# Patient Record
Sex: FEMALE | Race: WHITE | NOT HISPANIC OR LATINO | Employment: UNEMPLOYED | URBAN - METROPOLITAN AREA
[De-identification: names, ages, dates, MRNs, and addresses within clinical notes are randomized per-mention and may not be internally consistent; named-entity substitution may affect disease eponyms.]

---

## 2023-06-05 ENCOUNTER — HOSPITAL ENCOUNTER (OUTPATIENT)
Dept: DATA CONVERSION | Facility: HOSPITAL | Age: 48
End: 2023-06-05
Attending: SURGERY | Admitting: SURGERY
Payer: COMMERCIAL

## 2023-06-05 DIAGNOSIS — K80.20 CALCULUS OF GALLBLADDER WITHOUT CHOLECYSTITIS WITHOUT OBSTRUCTION: ICD-10-CM

## 2023-06-05 DIAGNOSIS — K66.0 PERITONEAL ADHESIONS (POSTPROCEDURAL) (POSTINFECTION): ICD-10-CM

## 2023-06-05 DIAGNOSIS — K25.9 GASTRIC ULCER, UNSPECIFIED AS ACUTE OR CHRONIC, WITHOUT HEMORRHAGE OR PERFORATION: ICD-10-CM

## 2023-06-05 DIAGNOSIS — K21.9 GASTRO-ESOPHAGEAL REFLUX DISEASE WITHOUT ESOPHAGITIS: ICD-10-CM

## 2023-06-05 DIAGNOSIS — K44.9 DIAPHRAGMATIC HERNIA WITHOUT OBSTRUCTION OR GANGRENE: ICD-10-CM

## 2023-06-05 DIAGNOSIS — R12 HEARTBURN: ICD-10-CM

## 2023-06-05 DIAGNOSIS — I10 ESSENTIAL (PRIMARY) HYPERTENSION: ICD-10-CM

## 2023-06-05 DIAGNOSIS — K29.70 GASTRITIS, UNSPECIFIED, WITHOUT BLEEDING: ICD-10-CM

## 2023-06-09 LAB
COMPLETE PATHOLOGY REPORT: NORMAL
CONVERTED CLINICAL DIAGNOSIS-HISTORY: NORMAL
CONVERTED FINAL DIAGNOSIS: NORMAL
CONVERTED FINAL REPORT PDF LINK TO COPY AND PASTE: NORMAL
CONVERTED GROSS DESCRIPTION: NORMAL

## 2023-09-30 NOTE — H&P
History & Physical Reviewed:   Pregnant/Lactating:  ·  Are You Pregnant no   ·  Are You Currently Breastfeeding no     I have reviewed the History and Physical dated:  26-May-2023   History and Physical reviewed and relevant findings noted. Patient examined to review pertinent physical  findings.: No significant changes   Home Medications Reviewed: no changes noted   Allergies Reviewed: no changes noted       ERAS (Enhanced Recovery After Surgery):  ·  ERAS Patient: no     Consent:   COVID-19 Consent:  ·  COVID-19 Risk Consent Surgeon has reviewed key risks related to the risk of kerry COVID-19 and if they contract COVID-19 what the risks are.     Attestation:   Note Completion:  I am a:  Resident/Fellow   Attending Attestation I saw and evaluated the patient.  I personally obtained the key and critical portions of the history and physical exam or was physically present for key and  critical portions performed by the resident/fellow. I reviewed the resident/fellow?s documentation and discussed the patient with the resident/fellow.  I agree with the resident/fellow?s medical decision making as documented in the note.     I personally evaluated the patient on 05-Jun-2023         Electronic Signatures:  Héctor Yao)  (Signed 05-Jun-2023 07:48)   Authored: Note Completion   Co-Signer: History & Physical Reviewed, ERAS, Consent, Note Completion  Lenny Saleh (Resident))  (Signed 05-Jun-2023 07:24)   Authored: History & Physical Reviewed, ERAS, Consent,  Note Completion      Last Updated: 05-Jun-2023 07:48 by Héctor Yao)

## 2023-10-02 NOTE — OP NOTE
PROCEDURE DETAILS    Preoperative Diagnosis:  Calculus of gallbladder without cholecystitis, K80.20  GERD (gastroesophageal reflux disease), K21.9    Postoperative Diagnosis:  Calculus of gallbladder without cholecystitis, K80.20  GERD (gastroesophageal reflux disease), K21.9  Hiatal hernia, rule out Salinas's esophagus  Surgeon: Héctor Yao  Resident/Fellow/Other Assistant: Clint De La O JJ    Procedure:  1. LAPAROSCOPIC CHOLECYSTECTOMY POSSIBLE OPEN  2. EGD WITH BIOPSY    Anesthesia: See OR Record  Estimated Blood Loss: 5 cc  Findings: Anterior and posterior artery with large neck stone and stomach adhered. Bile reflux, gastric ulcer, gastritis, 3 cm hiatal hernia with 3 cm of salmon colored esophageal mucosa.  Specimens(s) Collected: yes,  Gallbladder. Stomach, ulcer, and esophageal biopsies.   IV Fluids: See OR Record        Operative Report:   During the preoperative huddle the patient's identifiers, consent, operation details, and equipment was verified. The patient was taken to the operating room  and then placed in the supine position with both arms abducted and pressure points were padded. Sequential compression stockings were placed and general endotracheal anesthesia was administered. The patient was then prepped and draped in the usual sterile  fashion. Prophylactic antibiotics were administered. A surgical timeout was then performed confirming the correct patient, procedure, position, equipment, and any necessary operative implants.     Access to the abdomen was gained through a right midclavicular incision and utilized a 0° 5 mm laparoscope with an Optiview trocar. The abdomen was insufflated to a pressure of 15 mmHg after peritoneal access was obtained and there was no injury to  bowel or surrounding structures visualized. A 12 mm port was then placed at the umbilicus. An additional 5 mm port was placed in subxiphoid region, and an additional 5 mm trocar was placed at the  right anterior axillary line. All ports were placed under  direct visualization.    The gallbladder was then grasped and retracted cephalad by the dome and inferiorly and laterally at the infundibulum to open up the [place] of [place]. Thick adhesions to the pylorus were taken down sharply. Gross fatty attachments were taken off the gallbladder  with electrocautery, avoiding contact with underlying structures. Next, the peritoneal attachments on both the medial and lateral surfaces as well as the anterior surface of the gallbladder were taken with electrocautery. The [place] of [place] was then  dissected out and freed of any fatty attachments. Carrying on this dissection further revealed 2 and only 2 structures entering the gallbladder. The cystic plate was then dissected up about 30% of the gallbladder. The critical view of safety was then  obtained. The cystic artery was taken with two clips proximally and one distally. The cystic duct was taken close the infundibulum with two clips distally and one proximally. One lumen was visualized for each of these structures. An additional posterior  branch was found next and also clipped before cutting.    The gallbladder was then dissected off of the cystic plate using hook electrocautery. The liver bed was hemostatic and there was minimal spillage through a small posterior perforation. It was of no clinical consequence and related to the intrahepatic  nature of the dilated gallbladder. The gallbladder was placed into an Endo Catch bag and removed through the umbilicus. The 12 mm port site was closed with a figure-of-8 0-vicryl suture with the abdomen desufflated. The abdomen was then re-insufflated  and the suture was found to be adequate without any abdominal contents stuck the suture. In addition the liver bed was re-assessed and found to be hemostatic. Irrigation of the cystic plate was completed to wash away the minimal spilled bile, which was  clear.     The abdomen  was desufflated under direct visualization. All ports were removed. All counts were correct. The port sites were closed with a 4-0 Monocryl in the subcuticular plane. Dermabond was applied.    An EGD was performed with the following findings. Bile reflux through a wide open pylorus, gastric ulcer at lesser curve of body, gastritis throughout, 3 cm hiatal hernia with 3 cm of salmon colored esophageal mucosa which was biopsied. This was dictated  in provation.    The patient tolerated the procedure well. The patient was transported to the PACU in stable condition. I was present and scrubbed for all critical  portions of the case.                        Attestation:   Note Completion:  Attending Attestation I performed the procedure without a resident         Electronic Signatures:  Héctor Yao)  (Signed 05-Jun-2023 09:08)   Authored: Post-Operative Note, Chart Review, Note Completion      Last Updated: 05-Jun-2023 09:08 by Héctor Yao)

## 2024-01-02 ENCOUNTER — OFFICE VISIT (OUTPATIENT)
Dept: OBSTETRICS AND GYNECOLOGY | Facility: CLINIC | Age: 49
End: 2024-01-02
Payer: COMMERCIAL

## 2024-01-02 VITALS
WEIGHT: 230.7 LBS | SYSTOLIC BLOOD PRESSURE: 158 MMHG | HEIGHT: 67 IN | BODY MASS INDEX: 36.21 KG/M2 | DIASTOLIC BLOOD PRESSURE: 99 MMHG

## 2024-01-02 DIAGNOSIS — Z30.41 SURVEILLANCE FOR BIRTH CONTROL, ORAL CONTRACEPTIVES: Primary | ICD-10-CM

## 2024-01-02 DIAGNOSIS — Z12.31 SCREENING MAMMOGRAM FOR BREAST CANCER: ICD-10-CM

## 2024-01-02 PROCEDURE — 1036F TOBACCO NON-USER: CPT | Performed by: NURSE PRACTITIONER

## 2024-01-02 PROCEDURE — 99203 OFFICE O/P NEW LOW 30 MIN: CPT | Performed by: NURSE PRACTITIONER

## 2024-01-02 RX ORDER — METOPROLOL SUCCINATE 25 MG/1
25 TABLET, EXTENDED RELEASE ORAL DAILY
COMMUNITY

## 2024-01-02 RX ORDER — NORETHINDRONE 0.35 MG/1
1 TABLET ORAL DAILY
Qty: 28 TABLET | Refills: 12 | Status: SHIPPED | OUTPATIENT
Start: 2024-01-02 | End: 2024-02-09 | Stop reason: SDUPTHER

## 2024-01-02 RX ORDER — LISINOPRIL 40 MG/1
40 TABLET ORAL DAILY
COMMUNITY

## 2024-01-02 RX ORDER — NORETHINDRONE 0.35 MG/1
1 TABLET ORAL DAILY
COMMUNITY
End: 2024-01-02 | Stop reason: SDUPTHER

## 2024-01-02 RX ORDER — SEMAGLUTIDE 0.25 MG/.5ML
0.25 INJECTION, SOLUTION SUBCUTANEOUS
COMMUNITY
Start: 2023-03-27

## 2024-01-02 NOTE — PROGRESS NOTES
CC: Contraception discussion    HPI:  Rosa Drake is a 48 y.o. woman who presents to discuss contraception. Currently using progesterone-only pills. She is satisfied with this method, but having irreg BTB at times. Previously tried COCs, continuous method, but did have HTN and went to POP. She is sexually active with one male partners in the last year. She does not desire to conceive in the next few years.    Menses are irregular. Menstrual symptoms include: very heavy prior to going on COCs   LMP: Patient's last menstrual period was 12/31/2023.   Last mammogram normal 3/17/22  , has 3 adult children. Oldest daughter has 4 children.    Last pap: 12/15/22 negative, did have one abnormal 2018, ASCUS, negative HPV    Past Medical History:   Diagnosis Date    Hypertension      Past Surgical History:   Procedure Laterality Date    CHOLECYSTECTOMY      NO PAST SURGERIES      toe surgery    ROTATOR CUFF REPAIR      TOTAL KNEE ARTHROPLASTY Left 2023     No family history on file.  Social History     Socioeconomic History    Marital status:      Spouse name: Not on file    Number of children: Not on file    Years of education: Not on file    Highest education level: Not on file   Occupational History    Not on file   Tobacco Use    Smoking status: Never    Smokeless tobacco: Never   Substance and Sexual Activity    Alcohol use: Not on file    Drug use: Not on file    Sexual activity: Not on file   Other Topics Concern    Not on file   Social History Narrative    Not on file     Social Determinants of Health     Financial Resource Strain: Not on file   Food Insecurity: Not on file   Transportation Needs: Not on file   Physical Activity: Not on file   Stress: Not on file   Social Connections: Not on file   Intimate Partner Violence: Not on file   Housing Stability: Not on file       Current Outpatient Medications:     lisinopril 40 mg tablet, Take 1 tablet (40 mg) by mouth once daily., Disp: , Rfl:      metoprolol succinate XL (Toprol-XL) 25 mg 24 hr tablet, Take 1 tablet (25 mg) by mouth once daily., Disp: , Rfl:     norethindrone (Micronor) 0.35 mg tablet, Take 1 tablet (0.35 mg) by mouth once daily., Disp: , Rfl:     Wegovy 0.25 mg/0.5 mL pen injector, Inject 0.25 mg under the skin 1 (one) time per week., Disp: , Rfl:   Patient has no known allergies.    EXAM:  Gen: well appearing women in NAD    ASSESSMENT: good candidate for progesterone only pills. Pt has h/o HTN, has occas BTB. Discussed perimenopause at  length.    PLAN:  - progesterone-only pills, BTB can be normal, keep menstrual calendar  - Mammogram screening ordered for 3/2024  follow up: annual exam and PRN    Greater than 50% of this 25 minute visit was spent in direct patient counseling

## 2024-02-09 ENCOUNTER — PATIENT MESSAGE (OUTPATIENT)
Dept: OBSTETRICS AND GYNECOLOGY | Facility: CLINIC | Age: 49
End: 2024-02-09
Payer: COMMERCIAL

## 2024-02-09 DIAGNOSIS — Z30.41 SURVEILLANCE FOR BIRTH CONTROL, ORAL CONTRACEPTIVES: ICD-10-CM

## 2024-02-09 RX ORDER — NORETHINDRONE 0.35 MG/1
1 TABLET ORAL DAILY
Qty: 90 TABLET | Refills: 3 | Status: SHIPPED | OUTPATIENT
Start: 2024-02-09 | End: 2024-05-31

## 2024-03-19 ENCOUNTER — HOSPITAL ENCOUNTER (OUTPATIENT)
Dept: RADIOLOGY | Facility: CLINIC | Age: 49
Discharge: HOME | End: 2024-03-19
Payer: COMMERCIAL

## 2024-03-19 VITALS — WEIGHT: 220 LBS | BODY MASS INDEX: 36.65 KG/M2 | HEIGHT: 65 IN

## 2024-03-19 DIAGNOSIS — Z12.31 SCREENING MAMMOGRAM FOR BREAST CANCER: ICD-10-CM

## 2024-03-19 PROCEDURE — 77067 SCR MAMMO BI INCL CAD: CPT

## 2024-03-19 PROCEDURE — 77067 SCR MAMMO BI INCL CAD: CPT | Performed by: RADIOLOGY

## 2024-03-19 PROCEDURE — 77063 BREAST TOMOSYNTHESIS BI: CPT | Performed by: RADIOLOGY

## 2024-03-20 ENCOUNTER — HOSPITAL ENCOUNTER (OUTPATIENT)
Dept: RADIOLOGY | Facility: EXTERNAL LOCATION | Age: 49
Discharge: HOME | End: 2024-03-20

## 2024-05-30 DIAGNOSIS — Z30.41 SURVEILLANCE FOR BIRTH CONTROL, ORAL CONTRACEPTIVES: ICD-10-CM

## 2024-05-31 RX ORDER — NORETHINDRONE 0.35 MG/1
1 TABLET ORAL DAILY
Qty: 90 TABLET | Refills: 3 | Status: SHIPPED | OUTPATIENT
Start: 2024-05-31

## 2024-07-21 ENCOUNTER — PATIENT MESSAGE (OUTPATIENT)
Dept: OBSTETRICS AND GYNECOLOGY | Facility: CLINIC | Age: 49
End: 2024-07-21
Payer: COMMERCIAL

## 2024-09-06 DIAGNOSIS — Z30.41 SURVEILLANCE FOR BIRTH CONTROL, ORAL CONTRACEPTIVES: ICD-10-CM

## 2024-09-09 RX ORDER — DROSPIRENONE 4 MG/1
1 TABLET, FILM COATED ORAL DAILY
Qty: 28 TABLET | Refills: 6 | OUTPATIENT
Start: 2024-09-09

## 2025-03-19 DIAGNOSIS — Z30.41 SURVEILLANCE FOR BIRTH CONTROL, ORAL CONTRACEPTIVES: ICD-10-CM

## 2025-03-20 RX ORDER — DROSPIRENONE 4 MG/1
1 TABLET, FILM COATED ORAL DAILY
Qty: 28 TABLET | Refills: 6 | Status: SHIPPED | OUTPATIENT
Start: 2025-03-20

## 2025-03-21 ENCOUNTER — PATIENT MESSAGE (OUTPATIENT)
Dept: OBSTETRICS AND GYNECOLOGY | Facility: CLINIC | Age: 50
End: 2025-03-21
Payer: COMMERCIAL

## 2025-03-21 ENCOUNTER — HOSPITAL ENCOUNTER (OUTPATIENT)
Dept: RADIOLOGY | Facility: CLINIC | Age: 50
Discharge: HOME | End: 2025-03-21
Payer: COMMERCIAL

## 2025-03-21 VITALS — WEIGHT: 205 LBS | HEIGHT: 65 IN | BODY MASS INDEX: 34.16 KG/M2

## 2025-03-21 DIAGNOSIS — R92.8 ABNORMAL MAMMOGRAM: Primary | ICD-10-CM

## 2025-03-21 DIAGNOSIS — Z12.31 SCREENING MAMMOGRAM FOR BREAST CANCER: ICD-10-CM

## 2025-03-21 PROCEDURE — 77067 SCR MAMMO BI INCL CAD: CPT | Performed by: RADIOLOGY

## 2025-03-21 PROCEDURE — 77063 BREAST TOMOSYNTHESIS BI: CPT | Performed by: RADIOLOGY

## 2025-03-21 PROCEDURE — 77063 BREAST TOMOSYNTHESIS BI: CPT

## 2025-04-14 ENCOUNTER — APPOINTMENT (OUTPATIENT)
Dept: OBSTETRICS AND GYNECOLOGY | Facility: CLINIC | Age: 50
End: 2025-04-14
Payer: COMMERCIAL

## 2025-04-14 VITALS
SYSTOLIC BLOOD PRESSURE: 134 MMHG | DIASTOLIC BLOOD PRESSURE: 86 MMHG | WEIGHT: 208.6 LBS | HEIGHT: 65 IN | BODY MASS INDEX: 34.75 KG/M2

## 2025-04-14 DIAGNOSIS — Z01.419 WELL WOMAN EXAM WITH ROUTINE GYNECOLOGICAL EXAM: Primary | ICD-10-CM

## 2025-04-14 DIAGNOSIS — Z30.41 SURVEILLANCE FOR BIRTH CONTROL, ORAL CONTRACEPTIVES: ICD-10-CM

## 2025-04-14 PROCEDURE — 3008F BODY MASS INDEX DOCD: CPT | Performed by: NURSE PRACTITIONER

## 2025-04-14 PROCEDURE — 1036F TOBACCO NON-USER: CPT | Performed by: NURSE PRACTITIONER

## 2025-04-14 PROCEDURE — 99396 PREV VISIT EST AGE 40-64: CPT | Performed by: NURSE PRACTITIONER

## 2025-04-14 RX ORDER — DROSPIRENONE 4 MG/1
1 TABLET, FILM COATED ORAL DAILY
Qty: 84 TABLET | Refills: 3 | Status: SHIPPED | OUTPATIENT
Start: 2025-04-14

## 2025-04-14 NOTE — PROGRESS NOTES
HPI: Rosa Drake is a 49 y.o. year old  presenting for annual gyn exam   Pt still on progesterone only pills with dx HTN, well-controlled.    Current concerns: no concerns, would like to stay on POP Slynd, very happy with this method. Denies having any hot flashes, night sweats  , has 29yo daughter who has her own 4 children, Pt also has 17yo son senior in , and 10yo      LMP:  No LMP recorded. Patient is perimenopausal.; Still on POP, periods amenorrheic, even during placebo week while on Slynd since 2024.   Hot flashes/night sweats: no  STI concerns: no  Last mammogram: 3/2025, needs diagnostic bilat   Last pap: 2022 normal pap only at [facility]; previous 2018 ASCUS neg HPV  History of abnormal pap: no  Other gyn history:  x3   OB History        3    Para   3    Term   3            AB        Living   3       SAB        IAB        Ectopic        Multiple        Live Births   3           Obstetric Comments    x3          Past Medical History:  No date: Hypertension   Past Surgical History:  No date: CHOLECYSTECTOMY  No date: NO PAST SURGERIES      Comment:  toe surgery  No date: ROTATOR CUFF REPAIR  : TOTAL KNEE ARTHROPLASTY; Left   Social History    Socioeconomic History      Marital status:       Spouse name: Not on file      Number of children: Not on file      Years of education: Not on file      Highest education level: Not on file    Occupational History      Not on file    Tobacco Use      Smoking status: Never      Smokeless tobacco: Never    Substance and Sexual Activity      Alcohol use: Not on file      Drug use: Not on file      Sexual activity: Not on file    Other Topics      Concerns:        Not on file    Social History Narrative      Not on file    Social Drivers of Health  Financial Resource Strain: Low Risk  (2025)      Received from [facility]      Overall Financial Resource Strain (CARDIA)          Difficulty of Paying Living Expenses: Not  very hard  Food Insecurity: No Food Insecurity (1/29/2025)      Received from [facility]      Hunger Vital Sign          Worried About Running Out of Food in the Last Year: Never true          Ran Out of Food in the Last Year: Never true  Transportation Needs: No Transportation Needs (1/29/2025)      Received from [facility]      PRAPARE - Transportation          Lack of Transportation (Medical): No          Lack of Transportation (Non-Medical): No  Physical Activity: Inactive (1/29/2025)      Received from [facility]      Exercise Vital Sign          Days of Exercise per Week: 0 days          Minutes of Exercise per Session: 0 min  Stress: No Stress Concern Present (1/29/2025)      Received from [facility]      [place] [employer] - Occupational Stress Questionnaire          Feeling of Stress : Only a little  Social Connections: Moderately Isolated (1/29/2025)      Received from [facility]      Social Connection and Isolation Panel [NHANES]          Frequency of Communication with Friends and Family: Three times a week          Frequency of Social Gatherings with Friends and Family: Once a week          Attends Muslim Services: Never          Active Member of Clubs or Organizations: No          Attends Club or Organization Meetings: Patient declined          Marital Status:   Intimate Partner Violence: Not on file  Housing Stability: Low Risk  (12/24/2023)      Received from [facility], [facility]      Housing Stability Vital Sign          Unable to Pay for Housing in the Last Year: No          Number of Places Lived in the Last Year: 1          Unstable Housing in the Last Year: No   No family history on file.     Current Outpatient Medications:   lisinopril 40 mg tablet, Take 1 tablet (40 mg) by mouth once daily., Disp: , Rfl:   metoprolol succinate XL (Toprol-XL) 25 mg 24 hr tablet, Take 1 tablet (25 mg) by mouth once daily., Disp: , Rfl:    "Slynd 4 mg (28) tablet, TAKE 1 TABLET BY MOUTH EVERY DAY, Disp: 28 tablet, Rfl: 6  Wegovy 0.25 mg/0.5 mL pen injector, Inject 0.25 mg under the skin 1 (one) time per week., Disp: , Rfl:           REVIEW OF SYSTEMS:  Breast. denies masses, nipple discharge  : denies dysuria, hematuria; h/o occas stress incontinence   Gl: denies change in bowel habits, blood in stools      PHYSICAL EXAM:  Vitals: /86 (BP Location: Left arm, Patient Position: Sitting)   Ht 1.651 m (5' 5\")   Wt 94.6 kg (208 lb 9.6 oz)   BMI 34.71 kg/m²   Gen: well appearing woman in NAD  HEENT: normocephalic, thyroid not enlarged, no lymphadenopathy  CV: no m/r/g  Chest: CTAB, no w/r/r  Breast: normal tissue bilaterally without masses, skin changes, lymphadenopathy   Abdomen: soft, nontender, no masses/hernias, liver and spleen not enlarged   Pelvic:  - external genitalia: normal  - vagina: normal; atrophic changes noted  - cervix: normal  - uterus: normal size, nontender  - adnexa: no palpable masses or tenderness  RECTAL: no external hemorrhoids; rectal exam deferred    ASSESSMENT/PLAN :    1) Health maintenance, Well-woman exam.  Pap/HPV up to date   Mammogram diagnostic scheduled  Nutrition, exercise and routine health maintenance exams reviewed.  Calcium/Vitamin D supplementation information provided   Smoking cessation: never smoker  2) Postmenopausal: no concerns; still on POP not having periods for several months, will stay on for this year  3) STD screening: declines, no concerns  4) Follow up one year or sooner as needed   "

## 2025-05-30 ENCOUNTER — HOSPITAL ENCOUNTER (OUTPATIENT)
Dept: RADIOLOGY | Facility: CLINIC | Age: 50
Discharge: HOME | End: 2025-05-30
Payer: COMMERCIAL

## 2025-05-30 DIAGNOSIS — R92.8 ABNORMAL MAMMOGRAM: ICD-10-CM

## 2025-05-30 DIAGNOSIS — R92.8 ABNORMAL MAMMOGRAM: Primary | ICD-10-CM

## 2025-05-30 PROCEDURE — 76642 ULTRASOUND BREAST LIMITED: CPT

## 2025-05-30 PROCEDURE — 77066 DX MAMMO INCL CAD BI: CPT

## 2025-06-30 ENCOUNTER — TELEPHONE (OUTPATIENT)
Dept: OBSTETRICS AND GYNECOLOGY | Facility: CLINIC | Age: 50
End: 2025-06-30
Payer: COMMERCIAL

## 2025-06-30 DIAGNOSIS — N30.00 ACUTE CYSTITIS WITHOUT HEMATURIA: Primary | ICD-10-CM

## 2025-06-30 RX ORDER — NITROFURANTOIN 25; 75 MG/1; MG/1
100 CAPSULE ORAL 2 TIMES DAILY
Qty: 10 CAPSULE | Refills: 0 | Status: SHIPPED | OUTPATIENT
Start: 2025-06-30 | End: 2025-07-05

## 2025-06-30 NOTE — TELEPHONE ENCOUNTER
Pt sent Truly message to provider, c/o uti symptoms. Would like med sent to pharmacy on file.     LB out of office.     [facility] ([place])-4/14/25     Pt #- [phone]

## 2026-04-28 ENCOUNTER — APPOINTMENT (OUTPATIENT)
Dept: OBSTETRICS AND GYNECOLOGY | Facility: CLINIC | Age: 51
End: 2026-04-28
Payer: COMMERCIAL

## 2026-05-05 ENCOUNTER — APPOINTMENT (OUTPATIENT)
Dept: OBSTETRICS AND GYNECOLOGY | Facility: CLINIC | Age: 51
End: 2026-05-05
Payer: COMMERCIAL